# Patient Record
Sex: FEMALE | Race: WHITE | NOT HISPANIC OR LATINO | Employment: STUDENT | URBAN - METROPOLITAN AREA
[De-identification: names, ages, dates, MRNs, and addresses within clinical notes are randomized per-mention and may not be internally consistent; named-entity substitution may affect disease eponyms.]

---
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
## 2023-02-27 ENCOUNTER — APPOINTMENT (OUTPATIENT)
Dept: RADIOLOGY | Age: 18
End: 2023-02-27

## 2023-02-27 ENCOUNTER — OFFICE VISIT (OUTPATIENT)
Dept: URGENT CARE | Age: 18
End: 2023-02-27

## 2023-02-27 ENCOUNTER — TELEPHONE (OUTPATIENT)
Dept: OBGYN CLINIC | Facility: HOSPITAL | Age: 18
End: 2023-02-27

## 2023-02-27 VITALS — RESPIRATION RATE: 18 BRPM | TEMPERATURE: 98 F | HEART RATE: 84 BPM | OXYGEN SATURATION: 99 %

## 2023-02-27 DIAGNOSIS — M25.531 RIGHT WRIST PAIN: Primary | ICD-10-CM

## 2023-02-27 DIAGNOSIS — M65.9 TENOSYNOVITIS: ICD-10-CM

## 2023-02-27 DIAGNOSIS — M25.531 RIGHT WRIST PAIN: ICD-10-CM

## 2023-02-27 RX ORDER — NAPROXEN 500 MG/1
500 TABLET ORAL 2 TIMES DAILY WITH MEALS
Qty: 10 TABLET | Refills: 0 | Status: SHIPPED | OUTPATIENT
Start: 2023-02-27 | End: 2023-03-04

## 2023-02-27 NOTE — PROGRESS NOTES
[facility]        NAME: Leola Bautista is a 21 y o  female  : 2005    MRN: [id number]  DATE: 2023  TIME: 3:21 PM    Assessment and Plan   Right wrist pain [M25 531]  1  Right wrist pain  XR wrist 3+ vw right      2  Tenosynovitis  Ambulatory Referral to Orthopedic Surgery    naproxen (Naprosyn) 500 mg tablet        Patient presents for eval of ongoing right hand/wrist pain   Crochets and plays video games a lot  States went to  and was told it was tenosynovitis but they didn't do an extra  Tried motrin a couple days ago once  Denies injury/trauma  Assessment notes positive finklestein- xray notes no fracture- referred to ortho    Patient Instructions       Follow up with PCP / ortho    Chief Complaint   No chief complaint on file  History of Present Illness       Patient presents for eval of ongoing right hand/wrist pain   Crochets and plays video games a lot  States went to  and was told it was tenosynovitis but they didn't do an extra  Tried motrin a couple days ago once  Denies injury/trauma  Assessment notes positive finklestein- xray notes no fracture- referred to ortho      Review of Systems   Review of Systems   Constitutional: Negative for chills, fatigue and fever  HENT: Negative for postnasal drip and sore throat  Respiratory: Negative for cough and shortness of breath  Cardiovascular: Negative for chest pain and palpitations  Gastrointestinal: Negative for abdominal pain, nausea and vomiting  Genitourinary: Negative for dysuria  Musculoskeletal: Positive for myalgias  Negative for gait problem and joint swelling  Skin: Negative for rash  Neurological: Negative for dizziness, syncope, light-headedness, numbness and headaches  Psychiatric/Behavioral: Negative for agitation and confusion  All other systems reviewed and are negative          Current Medications       Current Outpatient Medications:   •  naproxen (Naprosyn) 500 mg tablet, Take 1 tablet (500 mg total) by mouth 2 (two) times a day with meals for 5 days, Disp: 10 tablet, Rfl: 0  •  drospirenone-ethinyl estradiol (CHELLE) 3-0 02 MG per tablet, Take 1 tablet by mouth daily, Disp: 90 tablet, Rfl: 1  •  escitalopram (LEXAPRO) 10 mg tablet, , Disp: , Rfl:   •  escitalopram (LEXAPRO) 20 mg tablet, , Disp: , Rfl:   •  traZODone (DESYREL) 50 mg tablet, Take 50 mg by mouth daily at bedtime, Disp: , Rfl:     Current Allergies     Allergies as of 02/27/2023   • (No Known Allergies)            The following portions of the patient's history were reviewed and updated as appropriate: allergies, current medications, past family history, past medical history, past social history, past surgical history and problem list      Past Medical History:   Diagnosis Date   • Anxiety    • Depression    • OCD (obsessive compulsive disorder)    • Tic disorder        Past Surgical History:   Procedure Laterality Date   • TONSILLECTOMY  2009   • WISDOM TOOTH EXTRACTION  2019       Family History   Problem Relation Age of Onset   • Breast cancer Neg Hx    • Uterine cancer Neg Hx    • Ovarian cancer Neg Hx    • Colon cancer Neg Hx          Medications have been verified  Objective   Pulse 84   Temp 98 °F (36 7 °C)   Resp 18   SpO2 99%   No LMP recorded  Physical Exam     Physical Exam  Vitals reviewed  Constitutional:       General: She is not in acute distress  Appearance: Normal appearance  She is not ill-appearing  HENT:      Head: Normocephalic and atraumatic  Eyes:      Extraocular Movements: Extraocular movements intact  Conjunctiva/sclera: Conjunctivae normal    Musculoskeletal:         General: Tenderness present  No swelling, deformity or signs of injury  Cervical back: Normal range of motion  Skin:     General: Skin is warm  Neurological:      General: No focal deficit present  Mental Status: She is alert     Psychiatric:         Mood and Affect: Mood normal  Behavior: Behavior normal          Judgment: Judgment normal

## 2023-02-27 NOTE — TELEPHONE ENCOUNTER
Patient is being referred to a orthopedics. Please schedule accordingly.     [facility]   [phone]

## 2023-09-08 ENCOUNTER — OFFICE VISIT (OUTPATIENT)
Age: 18
End: 2023-09-08
Payer: COMMERCIAL

## 2023-09-08 VITALS
BODY MASS INDEX: 30.8 KG/M2 | OXYGEN SATURATION: 100 % | TEMPERATURE: 97.2 F | HEIGHT: 61 IN | RESPIRATION RATE: 16 BRPM | SYSTOLIC BLOOD PRESSURE: 118 MMHG | WEIGHT: 163.14 LBS | DIASTOLIC BLOOD PRESSURE: 75 MMHG | HEART RATE: 72 BPM

## 2023-09-08 DIAGNOSIS — H53.9 VISUAL DISTURBANCE OF ONE EYE: ICD-10-CM

## 2023-09-08 DIAGNOSIS — G44.89 OTHER HEADACHE SYNDROME: Primary | ICD-10-CM

## 2023-09-08 DIAGNOSIS — R63.4 WEIGHT LOSS, NON-INTENTIONAL: ICD-10-CM

## 2023-09-08 PROCEDURE — 99283 EMERGENCY DEPT VISIT LOW MDM: CPT | Performed by: EMERGENCY MEDICINE

## 2023-09-08 PROCEDURE — G0382 LEV 3 HOSP TYPE B ED VISIT: HCPCS | Performed by: EMERGENCY MEDICINE

## 2023-09-08 NOTE — PATIENT INSTRUCTIONS
Acute Headache   WHAT YOU NEED TO KNOW:   An acute headache is pain or discomfort that may start suddenly and get worse quickly. You may have an acute headache only when you feel stress or eat certain foods. Other acute headache pain can happen every day, and sometimes several times a day. DISCHARGE INSTRUCTIONS:   Return to the emergency department if:   You have severe pain. You have numbness or weakness on one side of your face or body. You have a headache that occurs after a blow to the head, a fall, or other trauma. You have a headache, are forgetful or confused, or have trouble speaking. You have a headache, stiff neck, and a fever. Call your doctor if:   You have a constant headache and are vomiting. You have a headache each day that does not get better, even after treatment. You have changes in your headaches, or new symptoms that occur when you have a headache. You have questions or concerns about your condition or care. Medicines: You may need any of the following:  Prescription pain medicine  may be given. The medicine your healthcare provider recommends will depend on the kind of headaches you have. You will need to take prescription headache medicines as directed to prevent a problem called rebound headache. These headaches happen with regular use of pain relievers for headache disorders. NSAIDs , such as ibuprofen, help decrease swelling, pain, and fever. This medicine is available with or without a doctor's order. NSAIDs can cause stomach bleeding or kidney problems in certain people. If you take blood thinner medicine, always ask your healthcare provider if NSAIDs are safe for you. Always read the medicine label and follow directions. Acetaminophen  decreases pain and fever. It is available without a doctor's order. Ask how much to take and how often to take it. Follow directions.  Read the labels of all other medicines you are using to see if they also contain acetaminophen, or ask your doctor or pharmacist. Acetaminophen can cause liver damage if not taken correctly. Antidepressants  may be given for some kinds of headaches. Take your medicine as directed. Contact your healthcare provider if you think your medicine is not helping or if you have side effects. Tell your provider if you are allergic to any medicine. Keep a list of the medicines, vitamins, and herbs you take. Include the amounts, and when and why you take them. Bring the list or the pill bottles to follow-up visits. Carry your medicine list with you in case of an emergency. Manage your symptoms:   Apply heat or ice  on the headache area. Use a heat or ice pack. For an ice pack, you can also put crushed ice in a plastic bag. Cover the pack or bag with a towel before you apply it to your skin. Ice and heat both help decrease pain, and heat also helps decrease muscle spasms. Apply heat for 20 to 30 minutes every 2 hours. Apply ice for 15 to 20 minutes every hour. Apply heat or ice for as long and for as many days as directed. You may alternate heat and ice. Relax your muscles. Lie down in a comfortable position and close your eyes. Relax your muscles slowly. Start at your toes and work your way up your body. Keep a record of your headaches. Write down when your headaches start and stop. Include your symptoms and what you were doing when the headache began. Record what you ate or drank for 24 hours before the headache started. Describe the pain and where it hurts. Keep track of what you did to treat your headache and if it worked. Prevent an acute headache:   Avoid anything that triggers an acute headache. Examples include exposure to chemicals, going to high altitude, or not getting enough sleep. Create a regular sleep routine. Go to sleep at the same time and wake up at the same time each day. Do not use electronic devices before bedtime.  These may trigger a headache or prevent you from sleeping well. Do not smoke. Nicotine and other chemicals in cigarettes and cigars can trigger an acute headache or make it worse. Ask your healthcare provider for information if you currently smoke and need help to quit. E-cigarettes or smokeless tobacco still contain nicotine. Talk to your healthcare provider before you use these products. Limit alcohol as directed. Alcohol can trigger an acute headache or make it worse. If you have cluster headaches, do not drink alcohol during an episode. For other types of headaches, ask your healthcare provider if it is safe for you to drink alcohol. Ask how much is safe for you to drink, and how often. Exercise as directed. Exercise can reduce tension and help with headache pain. Aim for 30 minutes of physical activity on most days of the week. Your healthcare provider can help you create an exercise plan. Eat a variety of healthy foods. Healthy foods include fruits, vegetables, low-fat dairy products, lean meats, fish, whole grains, and cooked beans. Your healthcare provider or dietitian can help you create meals plans if you need to avoid foods that trigger headaches. Follow up with your healthcare provider as directed:  Bring your headache record with you when you see your healthcare provider. Write down your questions so you remember to ask them during your visits. © Copyright Lars Han 2022 Information is for End User's use only and may not be sold, redistributed or otherwise used for commercial purposes. The above information is an  only. It is not intended as medical advice for individual conditions or treatments. Talk to your doctor, nurse or pharmacist before following any medical regimen to see if it is safe and effective for you. Blurred Vision   WHAT YOU NEED TO KNOW:   Blurred vision is when you cannot see fine details. You may have blurred vision if you are nearsighted or farsighted and you need glasses.  Blurred vision may be caused by a corneal abrasion (scratch on the cornea) or a corneal ulcer (open sore). You may have blurred vision if your eye came into contact with a chemical. A foreign body or infection may also cause blurred vision. Medical conditions, such as cataracts, glaucoma, detached retina, and nerve disorders can also cause blurred vision. Blurred vision may also be caused by a concussion or a tumor. If you have diabetes, you may develop diabetic retinopathy. Diabetic retinopathy damages the blood vessels of your retina. DISCHARGE INSTRUCTIONS:   Return to the emergency department if:   You have weakness in an arm or leg, difficulty speaking or seeing, and a severe headache. You have a fever, eye pain, or discharge. You have a sudden loss of vision. Contact your healthcare provider if:   Your blurred vision gets worse. Your blurred vision is worse in the morning. You have a sudden headache or eye pain. Your eye has swelling, redness, or discharge. You see floaters, flashes of light, fine dots, or cobweb shapes. You have questions or concerns about your condition or care. Medicines: You may  need any of the following:  Prescription pain medicine  may be given. Ask how to take this medicine safely. Antibiotics  help prevent or treat an eye infection caused by bacteria. It may be given as eyedrops or an ointment. Take your medicine as directed. Contact your healthcare provider if you think your medicine is not helping or if you have side effects. Tell your provider if you are allergic to any medicine. Keep a list of the medicines, vitamins, and herbs you take. Include the amounts, and when and why you take them. Bring the list or the pill bottles to follow-up visits. Carry your medicine list with you in case of an emergency.     Manage your blurred vision:  Your healthcare provider may ask you to do any of the following:  Use artificial tears  to keep your eye moist or to soothe your irritated eye. Apply a cool compress  to decrease any swelling or pain. Wet a clean washcloth with cool water and place it on your eye. Use the cool compress as often as directed. Wear an eye patch as directed  to protect your eye. Follow up with your healthcare provider as directed: You may need other eye exams and medicines. Write down your questions so you remember to ask them during your visits. © Copyright Navneet Parsons 2022 Information is for End User's use only and may not be sold, redistributed or otherwise used for commercial purposes. The above information is an  only. It is not intended as medical advice for individual conditions or treatments. Talk to your doctor, nurse or pharmacist before following any medical regimen to see if it is safe and effective for you.

## 2023-09-08 NOTE — PROGRESS NOTES
[facility]        NAME: Lia Li is a 19 y.o. female  : 2005    MRN: [id number]  DATE: 2023  TIME: 1:54 PM    Assessment and Plan   Other headache syndrome [G44.89]  1. Other headache syndrome        2. Weight loss, non-intentional        3. Visual disturbance of one eye          Offered ambulance but patient refuses, she wants her boyfriend to drive her to, , wants her boyfriend to drive her to [facility] ER    Patient Instructions     There are no Patient Instructions on file for this visit. Follow up with PCP in 3-5 days. Proceed to  ER if symptoms worsen. Chief Complaint     Chief Complaint   Patient presents with   • Multiple Concerns     Head, neck, jaw pain on right side, ride temple. Trouble concentrating. Vision problems in left side of left eye in peripheral vision. Started 8 am this morning. History of Present Illness       Patient complains of chills on awakening this morning then went into her shower and noticed she could not feel the hot water on her feet. She then tried to go back to bed but noticed throbbing bilateral headache worse on the right associated with bilateral jaw pain worse on the right and blurred peripheral vision lateral visual field left eye. She admits to a similar but less severe headache last year which had visual symptoms but she never sought medical treatment for that episode. She denies photophobia, nausea, vomiting, stiff neck. Patient admits to 60 pound weight loss over the past 6 months. Weight loss was initially intentional but now continues without dieting. Review of Systems   Review of Systems   Constitutional: Positive for chills. Negative for activity change, fatigue and fever. HENT: Negative for congestion and sore throat. Eyes: Positive for visual disturbance. Negative for photophobia, pain, discharge, redness and itching. Respiratory: Negative for cough and shortness of breath. Gastrointestinal: Positive for vomiting. Negative for abdominal pain, blood in stool and nausea. Endocrine: Negative for cold intolerance, heat intolerance, polydipsia, polyphagia and polyuria. Genitourinary: Negative for hematuria. Musculoskeletal: Negative for arthralgias, back pain, joint swelling, myalgias, neck pain and neck stiffness. Skin: Negative for color change, rash and wound. Neurological: Positive for headaches. Negative for dizziness, syncope, weakness and light-headedness.          Current Medications       Current Outpatient Medications:   •  drospirenone-ethinyl estradiol (CHELLE) 3-0.02 MG per tablet, Take 1 tablet by mouth daily (Patient not taking: Reported on 9/8/2023), Disp: 90 tablet, Rfl: 1  •  escitalopram (LEXAPRO) 10 mg tablet, , Disp: , Rfl:   •  escitalopram (LEXAPRO) 20 mg tablet, , Disp: , Rfl:   •  naproxen (Naprosyn) 500 mg tablet, Take 1 tablet (500 mg total) by mouth 2 (two) times a day with meals for 5 days (Patient not taking: Reported on 9/8/2023), Disp: 10 tablet, Rfl: 0  •  traZODone (DESYREL) 50 mg tablet, Take 50 mg by mouth daily at bedtime (Patient not taking: Reported on 9/8/2023), Disp: , Rfl:     Current Allergies     Allergies as of 09/08/2023   • (No Known Allergies)            The following portions of the patient's history were reviewed and updated as appropriate: allergies, current medications, past family history, past medical history, past social history, past surgical history and problem list.     Past Medical History:   Diagnosis Date   • Anxiety    • Depression    • OCD (obsessive compulsive disorder)    • Tic disorder        Past Surgical History:   Procedure Laterality Date   • TONSILLECTOMY  2009   • WISDOM TOOTH EXTRACTION  2019       Family History   Problem Relation Age of Onset   • No Known Problems Mother    • No Known Problems Father    • Breast cancer Neg Hx    • Uterine cancer Neg Hx    • Ovarian cancer Neg Hx    • Colon cancer Neg Hx Medications have been verified. Objective   /75   Pulse 72   Temp (!) 97.2 °F (36.2 °C)   Resp 16   Ht 5' 1" (1.549 m)   Wt 74 kg (163 lb 2.3 oz)   LMP 08/18/2023   SpO2 100%   BMI 30.83 kg/m²        Physical Exam     Physical Exam  Vitals and nursing note reviewed. Constitutional:       General: She is not in acute distress. Appearance: She is well-developed. She is not ill-appearing, toxic-appearing or diaphoretic. HENT:      Head: Normocephalic and atraumatic. Right Ear: External ear normal.      Left Ear: Tympanic membrane and external ear normal.      Nose: Mucosal edema present. Mouth/Throat:      Pharynx: Posterior oropharyngeal erythema present. No oropharyngeal exudate. Tonsils: No tonsillar abscesses. Eyes:      General: No scleral icterus. Right eye: No discharge. Left eye: No discharge. Conjunctiva/sclera: Conjunctivae normal.      Pupils: Pupils are equal, round, and reactive to light. Funduscopic exam:     Right eye: No hemorrhage or papilledema. Left eye: No hemorrhage or papilledema. Comments: Discs flat with sharp borders, no hemorrhages or exudates. Neck:      Vascular: No carotid bruit. Cardiovascular:      Rate and Rhythm: Normal rate and regular rhythm. Pulmonary:      Effort: Pulmonary effort is normal.      Breath sounds: Normal breath sounds. Abdominal:      General: Bowel sounds are normal.      Palpations: Abdomen is soft. Musculoskeletal:      Cervical back: Neck supple. No rigidity or tenderness. Skin:     General: Skin is warm and dry. Findings: No rash. Neurological:      Mental Status: She is alert and oriented to person, place, and time. Psychiatric:         Mood and Affect: Mood normal.         Behavior: Behavior normal.         Thought Content:  Thought content normal.         Judgment: Judgment normal.